# Patient Record
Sex: FEMALE | Race: WHITE | NOT HISPANIC OR LATINO
[De-identification: names, ages, dates, MRNs, and addresses within clinical notes are randomized per-mention and may not be internally consistent; named-entity substitution may affect disease eponyms.]

---

## 2021-07-16 PROBLEM — Z00.00 ENCOUNTER FOR PREVENTIVE HEALTH EXAMINATION: Status: ACTIVE | Noted: 2021-07-16

## 2021-07-18 ENCOUNTER — NON-APPOINTMENT (OUTPATIENT)
Age: 51
End: 2021-07-18

## 2021-07-19 ENCOUNTER — NON-APPOINTMENT (OUTPATIENT)
Age: 51
End: 2021-07-19

## 2021-07-19 ENCOUNTER — APPOINTMENT (OUTPATIENT)
Dept: OTOLARYNGOLOGY | Facility: CLINIC | Age: 51
End: 2021-07-19
Payer: MEDICAID

## 2021-07-19 DIAGNOSIS — Z83.3 FAMILY HISTORY OF DIABETES MELLITUS: ICD-10-CM

## 2021-07-19 DIAGNOSIS — Z80.9 FAMILY HISTORY OF MALIGNANT NEOPLASM, UNSPECIFIED: ICD-10-CM

## 2021-07-19 DIAGNOSIS — Z78.9 OTHER SPECIFIED HEALTH STATUS: ICD-10-CM

## 2021-07-19 PROCEDURE — 92557 COMPREHENSIVE HEARING TEST: CPT

## 2021-07-19 PROCEDURE — 92567 TYMPANOMETRY: CPT

## 2021-07-19 PROCEDURE — 99203 OFFICE O/P NEW LOW 30 MIN: CPT

## 2021-07-19 RX ORDER — ACETAMINOPHEN,DIPHENHYDRAMINE HCL 500; 25 MG/1; MG/1
TABLET, FILM COATED ORAL
Refills: 0 | Status: ACTIVE | COMMUNITY

## 2021-07-19 RX ORDER — CHROMIUM 200 MCG
TABLET ORAL
Refills: 0 | Status: ACTIVE | COMMUNITY

## 2021-07-19 NOTE — HISTORY OF PRESENT ILLNESS
[de-identified] : GILBERTO MURCIA is a 51 year patient Bilateral constant tinnitus since attending a concert 1-1.5 years ago. She thinks it may have gotten a little bit better over the past 6 months but it has not resolved. She denies otalgia, otorrhea, dizziness, or change in her hearing.\par \par History of recurrent ear infections-no\par Prior ear surgery-known\par History of otologic trauma-no\par Noise exposure-she does have a history of noise exposure from attending a lot of concerts\par Family history of hearing loss-both of her parents started wearing a hearing aid in their 60s/70s \par Prior audiogram-no\par \par Nasal or sinus symptoms-no\par 	\par Throat symptoms-no\par

## 2021-07-19 NOTE — ASSESSMENT
[FreeTextEntry1] : She has bilateral tinnitus likely due to sensorineural hearing loss. She noticed it after attending a concertg last year. Her ears were normal on exam. Audiogram did show bilateral high-frequency sensorineural hearing loss. She also has TMJ dysfunction\par \par PLAN\par \par - findings and management options discussed with the patient. \par - Good aural hygiene.\par - noise precautions\par - repeat audiogram in one year\par - literature regarding tinnitus given\par - Avoid substances that can make tinnitus worse.  \par - she could consider a short burst of prednisone if she has exacerbation of the tinnitus. \par - tinnitus  therapy recommended\par - They may use a white noise maker or leave the tv/radio on when it is quiet \par - Other treatment options such as Elavil, tinnitus maskers, tinnitus therapy. Since she has hearing loss, she could consider a trial of hearing aids\par -consider treatment of TMJD\par - follow up in one year\par - call and return earlier if any concerns

## 2021-07-20 ENCOUNTER — TRANSCRIPTION ENCOUNTER (OUTPATIENT)
Age: 51
End: 2021-07-20

## 2023-02-20 ENCOUNTER — NON-APPOINTMENT (OUTPATIENT)
Age: 53
End: 2023-02-20

## 2023-02-22 ENCOUNTER — NON-APPOINTMENT (OUTPATIENT)
Age: 53
End: 2023-02-22

## 2023-02-22 ENCOUNTER — APPOINTMENT (OUTPATIENT)
Dept: ORTHOPEDIC SURGERY | Facility: CLINIC | Age: 53
End: 2023-02-22
Payer: MEDICAID

## 2023-02-22 VITALS — BODY MASS INDEX: 19.6 KG/M2 | WEIGHT: 112 LBS | HEIGHT: 63.5 IN

## 2023-02-22 DIAGNOSIS — M89.8X1 OTHER SPECIFIED DISORDERS OF BONE, SHOULDER: ICD-10-CM

## 2023-02-22 PROCEDURE — 72040 X-RAY EXAM NECK SPINE 2-3 VW: CPT

## 2023-02-22 PROCEDURE — 99203 OFFICE O/P NEW LOW 30 MIN: CPT

## 2023-02-22 PROCEDURE — 73030 X-RAY EXAM OF SHOULDER: CPT | Mod: RT

## 2023-02-24 PROBLEM — M89.8X1 PERISCAPULAR PAIN: Status: ACTIVE | Noted: 2023-02-24

## 2023-02-24 NOTE — ASSESSMENT
[FreeTextEntry1] : Discussed at length with patient regarding symptoms and diagnosis.  Treatment options discussed and patient's questions answered and patient expresses understanding.   If persistent symptoms consideration to MRI.  Patient to follow-up in office if persistent or recurrent symptoms.  Patient instructed to call if any questions or concerns.\par

## 2023-02-24 NOTE — HISTORY OF PRESENT ILLNESS
[de-identified] : Initial visit for Right Shoulder (posterior) \par Reason: Denies injury - [place] Trip no help with heavy luggage, believes this was the cause\par Duration 2months\par NO Pain Medications\par NO Prior Studies\par

## 2023-02-24 NOTE — REASON FOR VISIT
[Initial Visit] : an initial visit for [Shoulder Pain] : shoulder pain [FreeTextEntry2] : RIGHT Shoulder and Neck

## 2023-02-24 NOTE — PHYSICAL EXAM
[de-identified] : Right Shoulder:\par \par Constitutional:\par The patient is healthy-appearing and in no apparent distress. \par \par Cardiovascular System: \par The capillary refill is less than 2 seconds. \par \par Skin: \par There are no skin abnormalities.\par \par C-Spine/Neck:\par \par Active Range of Motion:\par Flexion				40\par Extension			40\par Lateral rotation			80  \par \par Right Shoulder: \par Inspection: \par There is no atrophy, erythema, warmth, swelling.\par There is no scapular winging.\par There is no AC prominence. \par \par Bony Palpation: \par There is no tenderness of the clavicle.\par There is no tenderness of the acromioclavicular joint.\par There is no tenderness of the greater tuberosity. \par There is no tenderness of the bicipital groove.\par  \par Soft Tissue Palpation: \par There is tenderness of the trapezius.\par There is tenderness of the rhomboid.\par There is no tenderness of the subacromial bursa. \par \par Active Range of Motion: \par Forward flexion- 				170 \par Abduction-					150\par External rotation at 0 degrees abduction-	80 \par Internal rotation at 0 degrees abduction-	80\par \par Passive Range of Motion: \par Forward flexion- 			180 \par Abduction-				150\par External rotation at 0 deg abduction-	80 \par Internal rotation at 0 deg abduction-	80\par \par Special Tests: \par Hawkin's  				Negative \par Neer's  				Negative\par Speed's  				Negative\par AC cross-over 			            Negative\par Esmeralda's  				Negative\par \par Stability: \par There is no general laxity. \par \par Psychiatric: \par The patient demonstrates a normal mood and affect and is active and alert\par \par  [de-identified] : X-ray cervical spine:  There is mid cervical arthritis and loss of lordosis\par X-ray right shoulder:  There is no fracture, arthritis or significant bony abnormality

## 2024-02-21 ENCOUNTER — NON-APPOINTMENT (OUTPATIENT)
Age: 54
End: 2024-02-21

## 2024-02-21 ENCOUNTER — APPOINTMENT (OUTPATIENT)
Dept: ORTHOPEDIC SURGERY | Facility: CLINIC | Age: 54
End: 2024-02-21
Payer: MEDICAID

## 2024-02-21 DIAGNOSIS — M25.511 PAIN IN RIGHT SHOULDER: ICD-10-CM

## 2024-02-21 DIAGNOSIS — M47.812 SPONDYLOSIS W/OUT MYELOPATHY OR RADICULOPATHY, CERVICAL REGION: ICD-10-CM

## 2024-02-21 PROCEDURE — 72050 X-RAY EXAM NECK SPINE 4/5VWS: CPT

## 2024-02-21 PROCEDURE — 73030 X-RAY EXAM OF SHOULDER: CPT | Mod: RT

## 2024-02-21 PROCEDURE — 99214 OFFICE O/P EST MOD 30 MIN: CPT | Mod: 25

## 2024-02-28 ENCOUNTER — APPOINTMENT (OUTPATIENT)
Dept: OTOLARYNGOLOGY | Facility: CLINIC | Age: 54
End: 2024-02-28
Payer: MEDICAID

## 2024-02-28 DIAGNOSIS — G47.9 SLEEP DISORDER, UNSPECIFIED: ICD-10-CM

## 2024-02-28 DIAGNOSIS — H90.3 SENSORINEURAL HEARING LOSS, BILATERAL: ICD-10-CM

## 2024-02-28 DIAGNOSIS — H93.13 TINNITUS, BILATERAL: ICD-10-CM

## 2024-02-28 DIAGNOSIS — J34.2 DEVIATED NASAL SEPTUM: ICD-10-CM

## 2024-02-28 DIAGNOSIS — G47.8 OTHER SLEEP DISORDERS: ICD-10-CM

## 2024-02-28 PROCEDURE — 92567 TYMPANOMETRY: CPT

## 2024-02-28 PROCEDURE — 99214 OFFICE O/P EST MOD 30 MIN: CPT | Mod: 25

## 2024-02-28 PROCEDURE — 31231 NASAL ENDOSCOPY DX: CPT

## 2024-02-28 PROCEDURE — 92557 COMPREHENSIVE HEARING TEST: CPT

## 2024-02-29 PROBLEM — H93.13 TINNITUS, BILATERAL: Status: ACTIVE | Noted: 2021-07-19

## 2024-02-29 PROBLEM — H90.3 SENSORINEURAL HEARING LOSS (SNHL) OF BOTH EARS: Status: ACTIVE | Noted: 2021-07-19

## 2024-02-29 PROBLEM — J34.2 DEVIATED NASAL SEPTUM: Status: ACTIVE | Noted: 2024-02-29

## 2024-02-29 NOTE — PHYSICAL EXAM
[TextEntry] : PHYSICAL EXAM  General: The patient was alert, oriented and in no distress. Voice was clear.  Face: The patient had no facial asymmetry or mass. The skin was unremarkable.  Ears: Hearing normal to conversational voice External ears were normal without deformity. Ear canals were clear. No cerumen or inflammation Tympanic membranes were intact and normal. No perforation or effusion. mobile  Nose:  The external nose had no significant deformity.     On anterior rhinoscopy, the nasal mucosa was clear.   The anterior septum was grossly midline. There were no visualized polyps, purulence  or masses.   Oral cavity: Oral mucosa- normal. Oral and base of tongue- clear and without mass. Gingival and buccal mucosa- moist and without lesions. Palate- the palate moved well. There was no cleft palate. There appeared to be good salivary flow.   Oral cavity/oropharynx- no pus, erythema or mass 1+ tonsils Type I Goff tongue position  Neck:  The neck was symmetrical. The parotid and submandibular glands were normal without masses. The trachea was midline and there was no unusual crepitus. Thyroid was smooth and nontender and no masses were palpated. No masses  Lymphatics: Cervical adenopathy- none.    PROCEDURE:  FLEXIBLE LARYNGOSCOPY, NASAL ENDOSCOPY   Surgeon: Dr. Maharaj Indication: Snoring, possible sleep apnea, assess for airway abnormality.  Inadequate exam and anterior rhinoscopy Anesthetic: Topical lidocaine and Afrin Procedure: The patient was placed in a sitting position.  Following application of the topical anesthetic and decongestant, exam was performed with a flexible telescope.  The scope was passed along the right nasal floor to the nasopharynx.  It was then passed into the region of the middle meatus, middle turbinate, and sphenoethmoid region.  An identical procedure was performed on the left side.  The following findings were noted:  Nasal mucosa: Mild edema Septum: Deviated to the left Right nasal cavity      Inferior turbinate: Mildly lower      Middle turbinate: normal      Superior turbinate: normal      Inferior meatus: no pus, polyps or congestion      Middle meatus:  no pus, polyps or congestion       Superior meatus:  no pus, polyps, or congestion      Sphenoethmoidal recess: no pus, polyps or congestion  Left nasal cavity      Inferior turbinate: Mildly enlarged      Middle turbinate: normal      Superior turbinate: normal      Inferior meatus: no pus, polyps or congestion      Middle meatus: no pus, polyps, or congestion      Superior meatus:  no pus, polyps, or congestion      Sphenoethmoidal recess: no pus, polyps or congestion   Nasopharynx: no masses, choanae patent, no adenoid tissue  Base of tongue and vallecula: no masses or asymmetry.  Mild base of tongue enlargement Posterior pharyngeal wall: no masses.  Hypopharynx: symmetrical. No masses Pyriform sinuses: no lesions or pooling of secretions Epiglottis: normal. No edema or lesions Aryepiglottic folds: normal. No lesions.  True vocal cords: clear and mobile. No lesions. Airway patent False vocal cords: normal Ventricles: no masses.  Arytenoids: Normal Interarytenoid area: no masses.  Normal Subglottis: normal. no masses    AUDIOGRAM- test ordered and the results reviewed and discussed with the patient.  It was compared to her prior audiogram Hearing-bilateral sensorineural hearing loss above 2000 Hz Word recognition-100% Tympanograms- AD- type A, AS- type A

## 2024-02-29 NOTE — HISTORY OF PRESENT ILLNESS
[de-identified] : GILBERTO MURCIA is a 53 year old patient Here for evaluation for snoring and possible sleep apnea.  She said that she has a history of snoring.  She is not sure if she has gasping for air or pauses in her sleep.  She does not sleep well at night and wakes up frequently.  She does not nap during the day.  She was diagnosed with hypertension.  She has not noticed a significant change in her weight.  She does not have nasal obstruction or congestion.  She denies a known history of reflux.  She has a history of tinnitus and bilateral sensorineural hearing loss.  She is getting a nightguard for TMJ dysfunction  ENT History No history of recurrent ear infections, prior otologic surgery, or ear trauma she does have a history of noise exposure from attending a lot of concerts both of her parents started wearing a hearing aid in their 60s/70s

## 2024-02-29 NOTE — ASSESSMENT
[FreeTextEntry1] : She has a history of snoring and possible sleep apnea.  She also has restless sleep.  She does not notice significant nasal obstruction or congestion.  She has not had a change in her weight.  She has bilateral tinnitus and sensorineural hearing loss.  She has TMJ dysfunction and is getting a nightguard  Plan -Findings and management options were discussed with the patient. -Continue good ear hygiene -Noise precautions -Monitor hearing -On further testing, she hears quite well and does not notice any difficulties.  She could consider hearing aid evaluation if she does have difficulty hearing -Continue treatment for TMJ dysfunction - The patient was asked to avoid sleeping on her back, eating before bed and drinking alcohol at night - Weight loss if she has gained weight - Elevation of the head of bed at night -If she has nasal congestion obstruction, she may try nasal steroid spray -I am sending her for evaluation at the sleep center.  Because of the restless sleep, she would benefit from a sleep study done at the sleep lab -We will discuss management options depending on the results.  She could try an oral appliance.  However, she also suffers from TMJ dysfunction and needs to speak with the dentist. -Follow-up after the sleep study - call and return earlier if any concerns.

## 2024-04-03 NOTE — PHYSICAL EXAM
[de-identified] : General: No acute distress, conversant, well-nourished. Head: Normocephalic, atraumatic Neck: trachea midline, FROM Heart: normotensive and normal rate and rhythm Lungs: No labored breathing Skin: No abrasions, no rashes, no edema Psych: Alert and oriented to person, place and time Extremities: no peripheral edema or digital cyanosis Gait: Normal gait. Can perform tandem gait.   Vascular: warm and well perfused distally, palpable distal pulses MSK: Right Shoulder Exam: No swelling, no erythema.   No tenderness to palpation.  No pain with active ROM  Negative Neer's impingement sign Negative Hawkin's test Negative Xavi's test Good strength with shoulder ER and IR.  No tenderness at bicipital groove Negative Speed's test Negative Yergson's test  Negative Cross-body Adduction Negative Steptoe's test  Sensation intact to light touch axillary, medial and lateral antebrachial cutaneous, median, radial and ulnar distributions +motor deltoid, biceps, triceps, EPL, FDP and IO palpable radial pulse, WWP distally Spine:  No tenderness to palpation.  No step-off, no deformity.  NEURO: Sensation           Left            C5     2/2                C6     2/2                C7     2/2                C8     2/2               T1     2/2                        Right          C5     2/2                C6     2/2                C7     2/2                C8     2/2               T1     2/2        Motor:                                                 Left              C5 (deltoid abduction)             5/5                C6 (biceps flexion)                   5/5                 C7 (triceps extension)             5/5                C8 (finger flexion)                     5/5                T1 (interosseous)                     5/5                                                            Right            C5 (deltoid abduction)             5/5                C6 (biceps flexion)                   5/5                 C7 (triceps extension)             5/5                C8 (finger flexion)                     5/5                T1 (interosseous)                     5/5                       Sensation  Left L2  -  2/2             Left L3  -  2/2 Left L4  -  2/2 Left L5  -  2/2 Left S1  -  2/2  Right L2  -  2/2             Right L3  -  2/2 Right L4  -  2/2 Right L5  -  2/2 Right S1  -  2/2  Motor:  Left L2 (hip flexion)                            5/5                 Left L3 (knee extension)                   5/5                 Left L4 (ankle dorsiflexion)                 5/5                 Left L5 (long toe extensor)                5/5                 Left S1 (ankle plantar flexion)           5/5  Right L2 (hip flexion)                            5/5                 Right L3 (knee extension)                   5/5                 Right L4 (ankle dorsiflexion)                 5/5                 Right L5 (long toe extensor)                5/5                 Right S1 (ankle plantar flexion)           5/5  Reflexes: Normal and symmetric Negative Spurlings test.  Negative Hoffmans reflex.   Negative clonus.  Down-going Babinski. [de-identified] : I ordered radiographs to evaluate the patient's symptoms. Cervical 4 view radiographs taken in the office today show no dislocation or fracture. Cervical spondylosis.  No instability on dynamic series. Right shoulder 2 view radiographs obtained in the office today shows no fracture or dislocation.

## 2024-04-03 NOTE — ASSESSMENT
[FreeTextEntry1] : 54 year old female presents with neck and right shoulder pain.  It started 2 weeks ago. She denies radicular pain, recent illness, fevers, numbness, weakness, balance problems, saddle anesthesia, urinary retention or fecal incontinence.  She notes "scapula winging" but is unsure when it started.  The patient was given a referral for physical therapy.  Discussed neurology evaluation of winging persists.  Metaxalone prn.  F/U in 4-6 weeks. We discussed red flag symptoms that would require emergent evaluation. She knows to call with any questions or concerns or if her symptoms acutely worsen.

## 2024-04-30 ENCOUNTER — APPOINTMENT (OUTPATIENT)
Dept: ORTHOPEDIC SURGERY | Facility: CLINIC | Age: 54
End: 2024-04-30
Payer: MEDICAID

## 2024-04-30 DIAGNOSIS — M47.12 OTHER SPONDYLOSIS WITH MYELOPATHY, CERVICAL REGION: ICD-10-CM

## 2024-04-30 PROCEDURE — 99214 OFFICE O/P EST MOD 30 MIN: CPT

## 2024-05-19 NOTE — ASSESSMENT
[FreeTextEntry1] : 54 year old female followup with neck and right shoulder pain.  It started 2 weeks ago. She denies recent illness, fevers, numbness, weakness, balance problems, saddle anesthesia, urinary retention or fecal incontinence.  She notes "scapula winging" but is unsure when it started.  Her symptoms have continued. I independently reviewed her cervical MRI which shows spinal cord compression with cord signal change.  I independently reviewed her right shoulder MRI which shows severe rotator cuff tendinosis with tears. We discussed the natural history of spinal cord compression and cervical myelopathy. We discussed the risks for SCI with trauma.  I recommended surgical decompression: ACDF. We discussed risks and benefits.  She was given a referral to a shoulder specialist. She will think about surgery. She will followup in the office in 2-3 weeks. We discussed red flag symptoms that would require emergent evaluation. She knows to call with any questions or concerns or if her symptoms acutely worsen.

## 2024-05-19 NOTE — HISTORY OF PRESENT ILLNESS
[de-identified] : This visit was provided by the [employer] service.  This telehealth appointment was conducted using real-time 2-way audiovisual technology.  The patient Deanne Brumfield was at her home during the time of the visit. The provider, Varinder Mejia was located at his office at [address] [place] [address] at the time of the visit.  The patient and Varinder Mejia participated in the telehealth encounter.  Verbal consent was given on April 30, 2024 by the patient.  HPI: Telehealth via [employer] service: 30 minutes  54 year old female followup with neck and right shoulder pain.  It started 2 weeks ago. She denies recent illness, fevers, numbness, weakness, balance problems, saddle anesthesia, urinary retention or fecal incontinence.  She notes "scapula winging" but is unsure when it started.  Her symptoms have continued. She is here to review her MRIs.

## 2024-05-19 NOTE — PHYSICAL EXAM
[de-identified] : General: NAD AAOx4, well-appearing Respiratory: No visible respiratory distress Psych: Good mood and appropriate affect Skin: WWP, no rashes MSK: Spine: no visible deformity, describes neck and shoulder pain Neuro: denies numbness, grossly moving all extremities [de-identified] : I independently reviewed her cervical MRI which shows spinal cord compression with cord signal change.   I independently reviewed her right shoulder MRI which shows severe rotator cuff tendinosis with tears.   MRI of the cervical spine 4/20/2024  Multilevel degenerative changes of the cervical spine contributing to moderate to severe spinal canal stenosis/cord compression at C5-6, moderate at C6-7, and mild at C3-4 and C4-5.  Moderate to severe multilevel foraminal stenoses.  Cord signal abnormality within the right lateral aspect of the cord at C5-6, compatible with spondylotic myelopathy and/or myelomalacia.   MRI RIGHT SHOULDER WITHOUT CONTRAST 04/17/2024  1.  Moderate to severe pathology in the rotator cuff characterized by multifocal and varying grades of tearing, most notably a high-grade bursal surface tear at the anterior supraspinatus tendon insertion and a chronic high-grade articular surface tear at the posterior supraspinatus/anterior infraspinatus junction. 2.  Associated reactive edema in the greater tuberosity suggestive of significant local inflammatory response. 3.  Trace subacromial-subdeltoid bursitis potentially contributing to shoulder pain and discomfort. 4.  Mild degenerative changes within the acromioclavicular joint.

## 2024-06-10 ENCOUNTER — APPOINTMENT (OUTPATIENT)
Dept: PULMONOLOGY | Facility: CLINIC | Age: 54
End: 2024-06-10
Payer: MEDICAID

## 2024-06-10 VITALS
OXYGEN SATURATION: 98 % | WEIGHT: 115 LBS | TEMPERATURE: 97.5 F | BODY MASS INDEX: 19.63 KG/M2 | DIASTOLIC BLOOD PRESSURE: 95 MMHG | HEART RATE: 83 BPM | SYSTOLIC BLOOD PRESSURE: 180 MMHG | HEIGHT: 64 IN

## 2024-06-10 DIAGNOSIS — R06.83 SNORING: ICD-10-CM

## 2024-06-10 PROCEDURE — 99204 OFFICE O/P NEW MOD 45 MIN: CPT

## 2024-06-11 PROBLEM — R06.83 SNORING: Status: ACTIVE | Noted: 2024-02-28

## 2024-06-11 NOTE — ASSESSMENT
[FreeTextEntry1] : This is a 54 year old female referred by Dr. Maharaj  for evaluation of possible sleep apnea. The patient has hypertension of unclear etiology. She was referred to the [facility] for a diagnostic HST. The ramifications of MATT and its potential therapeutic modalities were discussed with the patient. She will follow up with us after the HST.

## 2024-06-11 NOTE — CONSULT LETTER
[Dear  ___] : Dear  [unfilled], [Consult Letter:] : I had the pleasure of evaluating your patient, [unfilled]. [Please see my note below.] : Please see my note below. [Consult Closing:] : Thank you very much for allowing me to participate in the care of this patient.  If you have any questions, please do not hesitate to contact me. [Sincerely,] : Sincerely, [FreeTextEntry3] : Clarissa Pereira MD  [employer] at [facility] Pulmonary, Critical Care, and Sleep Medicine

## 2024-06-11 NOTE — HISTORY OF PRESENT ILLNESS
[FreeTextEntry1] : 55yo with HTN, being worked for [employer]. Does have intermittent awakenings during the night. Exercises daily.  [ESS] : 4

## 2024-06-11 NOTE — PHYSICAL EXAM
[General Appearance - Well Developed] : well developed [General Appearance - Well Nourished] : well nourished [Neck Appearance] : the appearance of the neck was normal [] : no respiratory distress [Respiration, Rhythm And Depth] : normal respiratory rhythm and effort [Abnormal Walk] : normal gait [Oriented To Time, Place, And Person] : oriented to person, place, and time [Impaired Insight] : insight and judgment were intact

## 2024-06-11 NOTE — REVIEW OF SYSTEMS
[Unusual Sleep Behavior] : no unusual sleep behavior [Hypersomnolence] : not sleeping much more than usual [Cataplexy] :  no cataplexy [Negative] : Genitourinary

## 2024-06-20 ENCOUNTER — NON-APPOINTMENT (OUTPATIENT)
Age: 54
End: 2024-06-20

## 2024-06-21 ENCOUNTER — APPOINTMENT (OUTPATIENT)
Dept: SLEEP CENTER | Facility: HOME HEALTH | Age: 54
End: 2024-06-21

## 2024-06-21 ENCOUNTER — OUTPATIENT (OUTPATIENT)
Dept: OUTPATIENT SERVICES | Facility: HOSPITAL | Age: 54
LOS: 1 days | End: 2024-06-21

## 2024-06-21 DIAGNOSIS — G47.33 OBSTRUCTIVE SLEEP APNEA (ADULT) (PEDIATRIC): ICD-10-CM

## 2024-06-21 PROCEDURE — 95800 SLP STDY UNATTENDED: CPT

## 2024-06-21 PROCEDURE — 95800 SLP STDY UNATTENDED: CPT | Mod: 26

## 2024-06-24 ENCOUNTER — TRANSCRIPTION ENCOUNTER (OUTPATIENT)
Age: 54
End: 2024-06-24

## 2024-06-25 ENCOUNTER — TRANSCRIPTION ENCOUNTER (OUTPATIENT)
Age: 54
End: 2024-06-25

## 2024-06-26 ENCOUNTER — TRANSCRIPTION ENCOUNTER (OUTPATIENT)
Age: 54
End: 2024-06-26

## 2024-06-27 ENCOUNTER — TRANSCRIPTION ENCOUNTER (OUTPATIENT)
Age: 54
End: 2024-06-27

## 2024-06-28 ENCOUNTER — APPOINTMENT (OUTPATIENT)
Dept: INTERNAL MEDICINE | Facility: CLINIC | Age: 54
End: 2024-06-28

## 2024-06-28 VITALS — SYSTOLIC BLOOD PRESSURE: 145 MMHG | DIASTOLIC BLOOD PRESSURE: 75 MMHG

## 2024-06-28 VITALS — HEIGHT: 64 IN | BODY MASS INDEX: 19.63 KG/M2 | WEIGHT: 115 LBS

## 2024-06-28 DIAGNOSIS — I10 ESSENTIAL (PRIMARY) HYPERTENSION: ICD-10-CM

## 2024-06-28 DIAGNOSIS — Z12.11 ENCOUNTER FOR SCREENING FOR MALIGNANT NEOPLASM OF COLON: ICD-10-CM

## 2024-06-28 PROCEDURE — 99396 PREV VISIT EST AGE 40-64: CPT

## 2024-06-28 PROCEDURE — 99203 OFFICE O/P NEW LOW 30 MIN: CPT | Mod: 25

## 2024-07-12 ENCOUNTER — APPOINTMENT (OUTPATIENT)
Dept: PULMONOLOGY | Facility: CLINIC | Age: 54
End: 2024-07-12

## 2024-07-12 DIAGNOSIS — R06.83 SNORING: ICD-10-CM

## 2024-07-12 DIAGNOSIS — G47.33 OBSTRUCTIVE SLEEP APNEA (ADULT) (PEDIATRIC): ICD-10-CM

## 2024-07-12 PROCEDURE — G2211 COMPLEX E/M VISIT ADD ON: CPT | Mod: NC,95

## 2024-07-12 PROCEDURE — 99442: CPT | Mod: 93

## 2024-07-15 PROBLEM — G47.33 OBSTRUCTIVE SLEEP APNEA: Status: ACTIVE | Noted: 2024-07-15

## 2024-07-17 ENCOUNTER — TRANSCRIPTION ENCOUNTER (OUTPATIENT)
Age: 54
End: 2024-07-17

## 2024-07-22 ENCOUNTER — TRANSCRIPTION ENCOUNTER (OUTPATIENT)
Age: 54
End: 2024-07-22

## 2024-07-23 ENCOUNTER — TRANSCRIPTION ENCOUNTER (OUTPATIENT)
Age: 54
End: 2024-07-23

## 2024-07-25 ENCOUNTER — APPOINTMENT (OUTPATIENT)
Dept: INTERNAL MEDICINE | Facility: CLINIC | Age: 54
End: 2024-07-25
Payer: MEDICAID

## 2024-07-25 DIAGNOSIS — I10 ESSENTIAL (PRIMARY) HYPERTENSION: ICD-10-CM

## 2024-07-25 PROCEDURE — 99213 OFFICE O/P EST LOW 20 MIN: CPT | Mod: 95

## 2024-07-25 PROCEDURE — G2211 COMPLEX E/M VISIT ADD ON: CPT | Mod: NC,95

## 2024-07-25 RX ORDER — NIFEDIPINE 30 MG/1
30 TABLET, EXTENDED RELEASE ORAL DAILY
Qty: 60 | Refills: 0 | Status: ACTIVE | COMMUNITY
Start: 2024-07-25 | End: 1900-01-01

## 2024-07-26 NOTE — HISTORY OF PRESENT ILLNESS
[Home] : at home, [unfilled] , at the time of the visit. [Medical Office: ([facility])___] : at the medical office located in  [Verbal consent obtained from patient] : the patient, [unfilled] [de-identified] : 54 y.o Female with PMHx of HTN (diagnosed ~1 year ago), Arthritis of Cervical spine currently undergoing PT, mild sleep apnea, will place mad overnight, not related to high BP.   In regards to her BP, states that she takes, her bp has still been roughly in the 140s, and it is not working, she states that she went up from HCTZ 12.5 mg to 25 mg once a day, and it has not helped her pressures, a little lightheadness. Patient states that she works out very day, plant based diet.   Denies any frequent headaches, dizziness, chest pain or abdominal pain. Denies frequency urinating.

## 2024-07-26 NOTE — HISTORY OF PRESENT ILLNESS
[Home] : at home, [unfilled] , at the time of the visit. [Medical Office: ([facility])___] : at the medical office located in  [Verbal consent obtained from patient] : the patient, [unfilled] [de-identified] : 54 y.o Female with PMHx of HTN (diagnosed ~1 year ago), Arthritis of Cervical spine currently undergoing PT, mild sleep apnea, will place mad overnight, not related to high BP.   In regards to her BP, states that she takes, her bp has still been roughly in the 140s, and it is not working, she states that she went up from HCTZ 12.5 mg to 25 mg once a day, and it has not helped her pressures, a little lightheadness. Patient states that she works out very day, plant based diet.   Denies any frequent headaches, dizziness, chest pain or abdominal pain. Denies frequency urinating.

## 2024-07-26 NOTE — ASSESSMENT
[FreeTextEntry1] : In regards to her BP, states that she takes, her bp has still been roughly in the 140s, and it is not working, she states that she went up from HCTZ 12.5 mg to 25 mg once a day,

## 2024-07-26 NOTE — END OF VISIT
[] : Resident [Time Spent: ___ minutes] : I have spent [unfilled] minutes of time on the encounter. [FreeTextEntry3] : 54F w/htn for VEB for BP check, discussed consolidating medications and starting nifedipine 30mg daily, had LE swelling with amlodipine. Will hold off on HCTZ for now and uptitrate Nifedipine as needed.  RTC in 1 week in person w/BP machine for BP check w/Dr. Bravo.

## 2024-08-08 ENCOUNTER — APPOINTMENT (OUTPATIENT)
Dept: INTERNAL MEDICINE | Facility: CLINIC | Age: 54
End: 2024-08-08

## 2025-02-12 ENCOUNTER — APPOINTMENT (OUTPATIENT)
Dept: INTERNAL MEDICINE | Facility: CLINIC | Age: 55
End: 2025-02-12

## 2025-05-22 ENCOUNTER — RX RENEWAL (OUTPATIENT)
Age: 55
End: 2025-05-22

## 2025-07-10 ENCOUNTER — NON-APPOINTMENT (OUTPATIENT)
Age: 55
End: 2025-07-10

## 2025-07-10 ENCOUNTER — RESULT REVIEW (OUTPATIENT)
Age: 55
End: 2025-07-10

## 2025-07-10 ENCOUNTER — APPOINTMENT (OUTPATIENT)
Dept: MAMMOGRAPHY | Facility: CLINIC | Age: 55
End: 2025-07-10

## 2025-07-10 ENCOUNTER — APPOINTMENT (OUTPATIENT)
Dept: INTERNAL MEDICINE | Facility: CLINIC | Age: 55
End: 2025-07-10

## 2025-07-10 ENCOUNTER — APPOINTMENT (OUTPATIENT)
Dept: MAMMOGRAPHY | Facility: CLINIC | Age: 55
End: 2025-07-10
Payer: MEDICAID

## 2025-07-10 VITALS
DIASTOLIC BLOOD PRESSURE: 86 MMHG | OXYGEN SATURATION: 99 % | WEIGHT: 128 LBS | HEART RATE: 98 BPM | BODY MASS INDEX: 21.85 KG/M2 | HEIGHT: 64 IN | SYSTOLIC BLOOD PRESSURE: 128 MMHG | TEMPERATURE: 96.8 F

## 2025-07-10 PROCEDURE — 77066 DX MAMMO INCL CAD BI: CPT

## 2025-07-10 PROCEDURE — 99214 OFFICE O/P EST MOD 30 MIN: CPT | Mod: 25

## 2025-07-10 PROCEDURE — G0279: CPT

## 2025-07-10 RX ORDER — HYDROCHLOROTHIAZIDE 12.5 MG/1
12.5 TABLET ORAL DAILY
Qty: 90 | Refills: 0 | Status: ACTIVE | COMMUNITY
Start: 2025-07-10 | End: 1900-01-01

## 2025-07-10 RX ORDER — NIFEDIPINE 30 MG/1
30 TABLET, EXTENDED RELEASE ORAL DAILY
Qty: 90 | Refills: 0 | Status: ACTIVE | COMMUNITY
Start: 2025-07-10 | End: 1900-01-01

## 2025-07-15 ENCOUNTER — NON-APPOINTMENT (OUTPATIENT)
Age: 55
End: 2025-07-15

## 2025-07-15 LAB
ANION GAP SERPL CALC-SCNC: 13 MMOL/L
APPEARANCE: CLEAR
BACTERIA: NEGATIVE /HPF
BILIRUBIN URINE: NEGATIVE
BLOOD URINE: NEGATIVE
BUN SERPL-MCNC: 15 MG/DL
CALCIUM SERPL-MCNC: 9.7 MG/DL
CAST: 0 /LPF
CHLORIDE SERPL-SCNC: 100 MMOL/L
CO2 SERPL-SCNC: 25 MMOL/L
COLOR: YELLOW
CREAT SERPL-MCNC: 0.54 MG/DL
EGFRCR SERPLBLD CKD-EPI 2021: 108 ML/MIN/1.73M2
EPITHELIAL CELLS: 0 /HPF
GLUCOSE QUALITATIVE U: NEGATIVE MG/DL
GLUCOSE SERPL-MCNC: 100 MG/DL
KETONES URINE: NEGATIVE MG/DL
LEUKOCYTE ESTERASE URINE: NEGATIVE
MICROSCOPIC-UA: NORMAL
NITRITE URINE: NEGATIVE
PH URINE: 6
POTASSIUM SERPL-SCNC: 4 MMOL/L
PROTEIN URINE: NEGATIVE MG/DL
RED BLOOD CELLS URINE: 0 /HPF
SODIUM SERPL-SCNC: 139 MMOL/L
SPECIFIC GRAVITY URINE: 1.01
UROBILINOGEN URINE: 0.2 MG/DL
WHITE BLOOD CELLS URINE: 0 /HPF

## 2025-09-04 ENCOUNTER — RX RENEWAL (OUTPATIENT)
Age: 55
End: 2025-09-04